# Patient Record
Sex: FEMALE | Race: BLACK OR AFRICAN AMERICAN | NOT HISPANIC OR LATINO | Employment: OTHER | URBAN - NONMETROPOLITAN AREA
[De-identification: names, ages, dates, MRNs, and addresses within clinical notes are randomized per-mention and may not be internally consistent; named-entity substitution may affect disease eponyms.]

---

## 2017-05-11 PROBLEM — N20.0 RENAL STONES: Status: ACTIVE | Noted: 2017-05-11

## 2017-05-11 PROBLEM — N30.11 INTERSTITIAL CYSTITIS (CHRONIC) WITH HEMATURIA: Status: ACTIVE | Noted: 2017-05-11

## 2020-06-08 ENCOUNTER — HISTORICAL (OUTPATIENT)
Dept: ADMINISTRATIVE | Facility: HOSPITAL | Age: 60
End: 2020-06-08

## 2020-06-11 LAB — SARS-COV-2 RNA RESP QL NAA+PROBE: NOT DETECTED

## 2021-01-22 DIAGNOSIS — R07.9 CHEST PAIN, UNSPECIFIED: Primary | ICD-10-CM

## 2021-01-25 ENCOUNTER — CLINICAL SUPPORT (OUTPATIENT)
Dept: CARDIOLOGY | Facility: HOSPITAL | Age: 61
End: 2021-01-25
Attending: INTERNAL MEDICINE
Payer: COMMERCIAL

## 2021-01-25 ENCOUNTER — HOSPITAL ENCOUNTER (OUTPATIENT)
Dept: RADIOLOGY | Facility: HOSPITAL | Age: 61
Discharge: HOME | End: 2021-01-25
Attending: INTERNAL MEDICINE
Payer: COMMERCIAL

## 2021-01-25 DIAGNOSIS — R07.9 CHEST PAIN, UNSPECIFIED: ICD-10-CM

## 2021-01-25 PROCEDURE — 78452 HT MUSCLE IMAGE SPECT MULT: CPT

## 2021-01-25 PROCEDURE — 93017 CV STRESS TEST TRACING ONLY: CPT

## 2021-01-26 LAB
CV STRESS BASE HR: 63 BPM
OHS CV CPX 85 PERCENT MAX PREDICTED HEART RATE MALE: 130
OHS CV CPX MAX PREDICTED HEART RATE: 153
OHS CV CPX PATIENT IS FEMALE: 1
OHS CV CPX PATIENT IS MALE: 0
OHS CV CPX PEAK HEAR RATE: 136 BPM
OHS CV CPX PERCENT MAX PREDICTED HEART RATE ACHIEVED: 89
STRESS ECHO POST EXERCISE DUR MIN: 6 MINUTES
STRESS ECHO POST EXERCISE DUR SEC: 29 SECONDS

## 2023-02-02 ENCOUNTER — LAB VISIT (OUTPATIENT)
Dept: LAB | Facility: HOSPITAL | Age: 63
End: 2023-02-02
Attending: INTERNAL MEDICINE
Payer: COMMERCIAL

## 2023-02-02 DIAGNOSIS — R94.31 NONSPECIFIC ABNORMAL ELECTROCARDIOGRAM (ECG) (EKG): Primary | ICD-10-CM

## 2023-02-02 LAB
ALBUMIN SERPL BCP-MCNC: 4.3 G/DL (ref 3.5–5.2)
ALP SERPL-CCNC: 60 U/L (ref 55–135)
ALT SERPL W/O P-5'-P-CCNC: 29 U/L (ref 10–44)
ANION GAP SERPL CALC-SCNC: 7 MMOL/L (ref 8–16)
AST SERPL-CCNC: 14 U/L (ref 10–40)
BASOPHILS # BLD AUTO: 0.06 K/UL (ref 0–0.2)
BASOPHILS NFR BLD: 1.2 % (ref 0–1.9)
BILIRUB SERPL-MCNC: 0.3 MG/DL (ref 0.1–1)
BUN SERPL-MCNC: 16 MG/DL (ref 8–23)
CALCIUM SERPL-MCNC: 10.2 MG/DL (ref 8.7–10.5)
CHLORIDE SERPL-SCNC: 104 MMOL/L (ref 95–110)
CO2 SERPL-SCNC: 29 MMOL/L (ref 23–29)
CREAT SERPL-MCNC: 0.7 MG/DL (ref 0.5–1.4)
DIFFERENTIAL METHOD: ABNORMAL
EOSINOPHIL # BLD AUTO: 0.1 K/UL (ref 0–0.5)
EOSINOPHIL NFR BLD: 1 % (ref 0–8)
ERYTHROCYTE [DISTWIDTH] IN BLOOD BY AUTOMATED COUNT: 12.9 % (ref 11.5–14.5)
EST. GFR  (NO RACE VARIABLE): >60 ML/MIN/1.73 M^2
GLUCOSE SERPL-MCNC: 107 MG/DL (ref 70–110)
HAV IGM SERPL QL IA: NORMAL
HBV CORE IGM SERPL QL IA: NORMAL
HBV SURFACE AG SERPL QL IA: NORMAL
HCT VFR BLD AUTO: 42.9 % (ref 37–48.5)
HCV AB SERPL QL IA: NORMAL
HGB BLD-MCNC: 13.5 G/DL (ref 12–16)
IMM GRANULOCYTES # BLD AUTO: 0.02 K/UL (ref 0–0.04)
IMM GRANULOCYTES NFR BLD AUTO: 0.4 % (ref 0–0.5)
LYMPHOCYTES # BLD AUTO: 1.9 K/UL (ref 1–4.8)
LYMPHOCYTES NFR BLD: 36.8 % (ref 18–48)
MCH RBC QN AUTO: 27.2 PG (ref 27–31)
MCHC RBC AUTO-ENTMCNC: 31.5 G/DL (ref 32–36)
MCV RBC AUTO: 86 FL (ref 82–98)
MONOCYTES # BLD AUTO: 0.5 K/UL (ref 0.3–1)
MONOCYTES NFR BLD: 8.8 % (ref 4–15)
NEUTROPHILS # BLD AUTO: 2.7 K/UL (ref 1.8–7.7)
NEUTROPHILS NFR BLD: 51.8 % (ref 38–73)
NRBC BLD-RTO: 0 /100 WBC
PLATELET # BLD AUTO: 364 K/UL (ref 150–450)
PMV BLD AUTO: 8.9 FL (ref 9.2–12.9)
POTASSIUM SERPL-SCNC: 4 MMOL/L (ref 3.5–5.1)
PROT SERPL-MCNC: 8.3 G/DL (ref 6–8.4)
RBC # BLD AUTO: 4.97 M/UL (ref 4–5.4)
SODIUM SERPL-SCNC: 140 MMOL/L (ref 136–145)
T4 FREE SERPL-MCNC: 0.85 NG/DL (ref 0.71–1.51)
TSH SERPL DL<=0.005 MIU/L-ACNC: 0.3 UIU/ML (ref 0.4–4)
WBC # BLD AUTO: 5.11 K/UL (ref 3.9–12.7)

## 2023-02-02 PROCEDURE — 80074 ACUTE HEPATITIS PANEL: CPT | Performed by: INTERNAL MEDICINE

## 2023-02-02 PROCEDURE — 80053 COMPREHEN METABOLIC PANEL: CPT | Performed by: INTERNAL MEDICINE

## 2023-02-02 PROCEDURE — 85025 COMPLETE CBC W/AUTO DIFF WBC: CPT | Performed by: INTERNAL MEDICINE

## 2023-02-02 PROCEDURE — 84443 ASSAY THYROID STIM HORMONE: CPT | Performed by: INTERNAL MEDICINE

## 2023-02-02 PROCEDURE — 36415 COLL VENOUS BLD VENIPUNCTURE: CPT | Performed by: INTERNAL MEDICINE

## 2023-02-02 PROCEDURE — 84439 ASSAY OF FREE THYROXINE: CPT | Performed by: INTERNAL MEDICINE

## 2023-02-06 DIAGNOSIS — R07.9 CHEST PAIN, UNSPECIFIED: Primary | ICD-10-CM

## 2023-02-06 DIAGNOSIS — R94.31 NONSPECIFIC ABNORMAL ELECTROCARDIOGRAM (ECG) (EKG): ICD-10-CM

## 2023-02-10 ENCOUNTER — HOSPITAL ENCOUNTER (OUTPATIENT)
Dept: RADIOLOGY | Facility: HOSPITAL | Age: 63
Discharge: HOME | End: 2023-02-10
Attending: INTERNAL MEDICINE
Payer: COMMERCIAL

## 2023-02-10 ENCOUNTER — CLINICAL SUPPORT (OUTPATIENT)
Dept: CARDIOLOGY | Facility: HOSPITAL | Age: 63
End: 2023-02-10
Attending: INTERNAL MEDICINE
Payer: COMMERCIAL

## 2023-02-10 DIAGNOSIS — R07.9 CHEST PAIN, UNSPECIFIED: ICD-10-CM

## 2023-02-10 DIAGNOSIS — R94.31 NONSPECIFIC ABNORMAL ELECTROCARDIOGRAM (ECG) (EKG): ICD-10-CM

## 2023-02-10 PROCEDURE — A9500 TC99M SESTAMIBI: HCPCS

## 2023-02-10 PROCEDURE — 93017 CV STRESS TEST TRACING ONLY: CPT

## 2023-02-10 PROCEDURE — 78452 HT MUSCLE IMAGE SPECT MULT: CPT

## 2023-02-15 LAB
CV STRESS BASE HR: 73 BPM
DIASTOLIC BLOOD PRESSURE: 109 MMHG
NUC REST EJECTION FRACTION: 70
OHS CV CPX 85 PERCENT MAX PREDICTED HEART RATE MALE: 128
OHS CV CPX ESTIMATED METS: 6
OHS CV CPX MAX PREDICTED HEART RATE: 151
OHS CV CPX PATIENT IS FEMALE: 1
OHS CV CPX PATIENT IS MALE: 0
OHS CV CPX PEAK DIASTOLIC BLOOD PRESSURE: 109 MMHG
OHS CV CPX PEAK HEAR RATE: 135 BPM
OHS CV CPX PEAK RATE PRESSURE PRODUCT: NORMAL
OHS CV CPX PEAK SYSTOLIC BLOOD PRESSURE: 163 MMHG
OHS CV CPX PERCENT MAX PREDICTED HEART RATE ACHIEVED: 90
OHS CV CPX RATE PRESSURE PRODUCT PRESENTING: NORMAL
STRESS ECHO POST EXERCISE DUR MIN: 4 MINUTES
SYSTOLIC BLOOD PRESSURE: 143 MMHG

## 2023-11-02 ENCOUNTER — LAB VISIT (OUTPATIENT)
Dept: LAB | Facility: HOSPITAL | Age: 63
End: 2023-11-02
Attending: INTERNAL MEDICINE
Payer: COMMERCIAL

## 2023-11-02 DIAGNOSIS — E05.90 PRETIBIAL MYXEDEMA: ICD-10-CM

## 2023-11-02 DIAGNOSIS — R73.9 BLOOD GLUCOSE ELEVATED: ICD-10-CM

## 2023-11-02 DIAGNOSIS — E78.00 PURE HYPERCHOLESTEROLEMIA: Primary | ICD-10-CM

## 2023-11-02 DIAGNOSIS — E78.00 PURE HYPERCHOLESTEROLEMIA: ICD-10-CM

## 2023-11-02 LAB
ALBUMIN SERPL BCP-MCNC: 4.1 G/DL (ref 3.5–5.2)
ALP SERPL-CCNC: 64 U/L (ref 55–135)
ALT SERPL W/O P-5'-P-CCNC: 22 U/L (ref 10–44)
ANION GAP SERPL CALC-SCNC: 3 MMOL/L (ref 3–11)
AST SERPL-CCNC: 17 U/L (ref 10–40)
BILIRUB SERPL-MCNC: 0.4 MG/DL (ref 0.1–1)
BUN SERPL-MCNC: 11 MG/DL (ref 8–23)
CALCIUM SERPL-MCNC: 8.9 MG/DL (ref 8.7–10.5)
CHLORIDE SERPL-SCNC: 109 MMOL/L (ref 95–110)
CHOLEST SERPL-MCNC: 241 MG/DL (ref 120–199)
CHOLEST/HDLC SERPL: 4.2 {RATIO} (ref 2–5)
CO2 SERPL-SCNC: 29 MMOL/L (ref 23–29)
CREAT SERPL-MCNC: 0.8 MG/DL (ref 0.5–1.4)
EST. GFR  (NO RACE VARIABLE): >60 ML/MIN/1.73 M^2
ESTIMATED AVG GLUCOSE: 111 MG/DL (ref 68–131)
GLUCOSE SERPL-MCNC: 102 MG/DL (ref 70–110)
HBA1C MFR BLD: 5.5 % (ref 4–5.6)
HDLC SERPL-MCNC: 57 MG/DL (ref 40–75)
HDLC SERPL: 23.7 % (ref 20–50)
LDLC SERPL CALC-MCNC: 165.2 MG/DL (ref 63–159)
NONHDLC SERPL-MCNC: 184 MG/DL
POTASSIUM SERPL-SCNC: 4 MMOL/L (ref 3.5–5.1)
PROT SERPL-MCNC: 7.7 G/DL (ref 6–8.4)
SODIUM SERPL-SCNC: 141 MMOL/L (ref 136–145)
T4 FREE SERPL-MCNC: 0.87 NG/DL (ref 0.71–1.51)
TRIGL SERPL-MCNC: 94 MG/DL (ref 30–150)
TSH SERPL DL<=0.005 MIU/L-ACNC: 0.26 UIU/ML (ref 0.4–4)

## 2023-11-02 PROCEDURE — 84443 ASSAY THYROID STIM HORMONE: CPT | Performed by: INTERNAL MEDICINE

## 2023-11-02 PROCEDURE — 84439 ASSAY OF FREE THYROXINE: CPT | Performed by: INTERNAL MEDICINE

## 2023-11-02 PROCEDURE — 83036 HEMOGLOBIN GLYCOSYLATED A1C: CPT | Performed by: INTERNAL MEDICINE

## 2023-11-02 PROCEDURE — 80053 COMPREHEN METABOLIC PANEL: CPT | Performed by: INTERNAL MEDICINE

## 2023-11-02 PROCEDURE — 80061 LIPID PANEL: CPT | Performed by: INTERNAL MEDICINE

## 2023-11-02 PROCEDURE — 36415 COLL VENOUS BLD VENIPUNCTURE: CPT | Performed by: INTERNAL MEDICINE

## 2024-03-21 ENCOUNTER — TELEPHONE (OUTPATIENT)
Dept: CARDIOLOGY | Facility: CLINIC | Age: 64
End: 2024-03-21
Payer: COMMERCIAL

## 2024-03-21 ENCOUNTER — LAB VISIT (OUTPATIENT)
Dept: LAB | Facility: HOSPITAL | Age: 64
End: 2024-03-21
Attending: INTERNAL MEDICINE
Payer: COMMERCIAL

## 2024-03-21 DIAGNOSIS — R06.02 SOB (SHORTNESS OF BREATH): ICD-10-CM

## 2024-03-21 DIAGNOSIS — R07.9 CHEST PAIN, UNSPECIFIED TYPE: ICD-10-CM

## 2024-03-21 DIAGNOSIS — R07.9 CHEST PAIN, UNSPECIFIED TYPE: Primary | ICD-10-CM

## 2024-03-21 LAB
ANION GAP SERPL CALC-SCNC: 6 MMOL/L (ref 3–11)
APTT PPP: 24.3 SEC (ref 21–32)
BASOPHILS # BLD AUTO: 0.09 K/UL (ref 0–0.2)
BASOPHILS NFR BLD: 2 % (ref 0–1.9)
BUN SERPL-MCNC: 10 MG/DL (ref 8–23)
CALCIUM SERPL-MCNC: 9.7 MG/DL (ref 8.7–10.5)
CHLORIDE SERPL-SCNC: 108 MMOL/L (ref 95–110)
CO2 SERPL-SCNC: 28 MMOL/L (ref 23–29)
CREAT SERPL-MCNC: 0.7 MG/DL (ref 0.5–1.4)
DIFFERENTIAL METHOD BLD: ABNORMAL
EOSINOPHIL # BLD AUTO: 0.1 K/UL (ref 0–0.5)
EOSINOPHIL NFR BLD: 2 % (ref 0–8)
ERYTHROCYTE [DISTWIDTH] IN BLOOD BY AUTOMATED COUNT: 13 % (ref 11.5–14.5)
EST. GFR  (NO RACE VARIABLE): >60 ML/MIN/1.73 M^2
GLUCOSE SERPL-MCNC: 99 MG/DL (ref 70–110)
HCT VFR BLD AUTO: 39 % (ref 37–48.5)
HGB BLD-MCNC: 12.3 G/DL (ref 12–16)
IMM GRANULOCYTES # BLD AUTO: 0.01 K/UL (ref 0–0.04)
IMM GRANULOCYTES NFR BLD AUTO: 0.2 % (ref 0–0.5)
INR PPP: 1 (ref 0.8–1.2)
LYMPHOCYTES # BLD AUTO: 2 K/UL (ref 1–4.8)
LYMPHOCYTES NFR BLD: 43.6 % (ref 18–48)
MCH RBC QN AUTO: 27.8 PG (ref 27–31)
MCHC RBC AUTO-ENTMCNC: 31.5 G/DL (ref 32–36)
MCV RBC AUTO: 88 FL (ref 82–98)
MONOCYTES # BLD AUTO: 0.4 K/UL (ref 0.3–1)
MONOCYTES NFR BLD: 8.7 % (ref 4–15)
NEUTROPHILS # BLD AUTO: 2 K/UL (ref 1.8–7.7)
NEUTROPHILS NFR BLD: 43.5 % (ref 38–73)
NRBC BLD-RTO: 0 /100 WBC
PLATELET # BLD AUTO: 288 K/UL (ref 150–450)
PMV BLD AUTO: 9.6 FL (ref 9.2–12.9)
POTASSIUM SERPL-SCNC: 3.7 MMOL/L (ref 3.5–5.1)
PROTHROMBIN TIME: 10.8 SEC (ref 9–12.5)
RBC # BLD AUTO: 4.43 M/UL (ref 4–5.4)
SODIUM SERPL-SCNC: 142 MMOL/L (ref 136–145)
WBC # BLD AUTO: 4.61 K/UL (ref 3.9–12.7)

## 2024-03-21 PROCEDURE — 80048 BASIC METABOLIC PNL TOTAL CA: CPT | Performed by: INTERNAL MEDICINE

## 2024-03-21 PROCEDURE — 36415 COLL VENOUS BLD VENIPUNCTURE: CPT | Performed by: INTERNAL MEDICINE

## 2024-03-21 PROCEDURE — 85730 THROMBOPLASTIN TIME PARTIAL: CPT | Performed by: INTERNAL MEDICINE

## 2024-03-21 PROCEDURE — 85610 PROTHROMBIN TIME: CPT | Performed by: INTERNAL MEDICINE

## 2024-03-21 PROCEDURE — 85025 COMPLETE CBC W/AUTO DIFF WBC: CPT | Performed by: INTERNAL MEDICINE

## 2024-03-21 NOTE — TELEPHONE ENCOUNTER
Received records from Dr. Chavez' Staff requesting patient be scheduled for cath with Dr. Arana and all scanned to Media    Telephoned patient to Needs to discuss/confirm scheduling Heart cath and confirmed Tuesday 3/26 for 1130am with 10am arrival time.  Reviewed NPO status, medications and exception, scheduled Lab work at [facility] in Section, answered all questions and ready to proceed

## 2024-03-25 ENCOUNTER — TELEPHONE (OUTPATIENT)
Dept: CARDIOLOGY | Facility: CLINIC | Age: 64
End: 2024-03-25
Payer: COMMERCIAL

## 2024-03-25 ENCOUNTER — PATIENT MESSAGE (OUTPATIENT)
Dept: CARDIOLOGY | Facility: CLINIC | Age: 64
End: 2024-03-25
Payer: COMMERCIAL

## 2024-03-25 NOTE — TELEPHONE ENCOUNTER
----- Message from Franchesca Tapia sent at 3/25/2024  3:54 PM CDT -----  Contact: Patient, [phone]  Calling because [facility] is requesting a Inpz-ta-Pqmi, phone [phone]., for the Angiogram. Please advise. Thanks.

## 2024-03-25 NOTE — TELEPHONE ENCOUNTER
Returned call and spoke with patient and spouse in the back ground. I explained to patient conversation had with Mrs. Roa ans patient states that's  what Mrs. Roa said.    Patient was instrcted to await return call from Mrs. Roa    Patient verbalized understanding.

## 2024-03-25 NOTE — TELEPHONE ENCOUNTER
Dr. Chavez has been notified    ----- Message from Franchesca Tapia sent at 3/25/2024  3:54 PM CDT -----  Contact: Patient, [phone]  Calling because [facility] is requesting a Xfxr-jk-Pijw, phone [phone]., for the Angiogram. Please advise. Thanks.

## 2024-03-25 NOTE — TELEPHONE ENCOUNTER
Attempted to get patient on her phone line without success  Contacted her alternate line and spoke with  Price and Mrs. Price advised of need to postpone for now- pending follow up with Insurance' recommendations

## 2024-03-25 NOTE — TELEPHONE ENCOUNTER
Returned call to patient  and answered all questions, repeated arrival [phone]  Also notified Insurance approval is still pending  Checking with Dr. Chavez for possible P2P [phone]/ ID [id number]      ----- Message from Tamica Babcock sent at 3/25/2024  3:28 PM CDT -----  .Type:  Needs Medical Advice    Who Called: pt  Would the patient rather a call back or a response via [facility]? Call back  Best Call Back Number: [phone]  Additional Information:     Pt would like a call back for conformation for her procedure tomorrow

## 2024-03-26 ENCOUNTER — HOSPITAL ENCOUNTER (OUTPATIENT)
Facility: HOSPITAL | Age: 64
Discharge: HOME | End: 2024-03-26
Attending: INTERNAL MEDICINE | Admitting: INTERNAL MEDICINE
Payer: COMMERCIAL

## 2024-03-26 ENCOUNTER — PATIENT MESSAGE (OUTPATIENT)
Dept: CARDIOLOGY | Facility: CLINIC | Age: 64
End: 2024-03-26
Payer: COMMERCIAL

## 2024-03-26 VITALS
RESPIRATION RATE: 21 BRPM | WEIGHT: 147.06 LBS | OXYGEN SATURATION: 99 % | HEART RATE: 61 BPM | HEIGHT: 63 IN | SYSTOLIC BLOOD PRESSURE: 165 MMHG | DIASTOLIC BLOOD PRESSURE: 83 MMHG | TEMPERATURE: 98 F | BODY MASS INDEX: 26.06 KG/M2

## 2024-03-26 DIAGNOSIS — E78.01 FAMILIAL HYPERCHOLESTEROLEMIA: ICD-10-CM

## 2024-03-26 DIAGNOSIS — Z01.810 PREPROCEDURAL CARDIOVASCULAR EXAMINATION: ICD-10-CM

## 2024-03-26 DIAGNOSIS — R94.31 ABNORMAL EKG: ICD-10-CM

## 2024-03-26 DIAGNOSIS — R94.39 ABNORMAL STRESS ECG WITH TREADMILL: ICD-10-CM

## 2024-03-26 DIAGNOSIS — R07.89 CHEST TIGHTNESS: ICD-10-CM

## 2024-03-26 DIAGNOSIS — R06.02 SOB (SHORTNESS OF BREATH): ICD-10-CM

## 2024-03-26 DIAGNOSIS — R06.09 DOE (DYSPNEA ON EXERTION): ICD-10-CM

## 2024-03-26 PROBLEM — I20.0 ACCELERATING ANGINA: Status: ACTIVE | Noted: 2024-03-26

## 2024-03-26 PROBLEM — E78.5 HYPERLIPIDEMIA: Status: ACTIVE | Noted: 2024-03-26

## 2024-03-26 LAB — CATH EF QUANTITATIVE: 60 %

## 2024-03-26 PROCEDURE — 25000003 PHARM REV CODE 250: Performed by: INTERNAL MEDICINE

## 2024-03-26 PROCEDURE — C1769 GUIDE WIRE: HCPCS | Performed by: INTERNAL MEDICINE

## 2024-03-26 PROCEDURE — 99152 MOD SED SAME PHYS/QHP 5/>YRS: CPT | Mod: ,,, | Performed by: INTERNAL MEDICINE

## 2024-03-26 PROCEDURE — C1887 CATHETER, GUIDING: HCPCS | Performed by: INTERNAL MEDICINE

## 2024-03-26 PROCEDURE — 93010 ELECTROCARDIOGRAM REPORT: CPT | Mod: ,,, | Performed by: STUDENT IN AN ORGANIZED HEALTH CARE EDUCATION/TRAINING PROGRAM

## 2024-03-26 PROCEDURE — 93458 L HRT ARTERY/VENTRICLE ANGIO: CPT | Mod: 26,,, | Performed by: INTERNAL MEDICINE

## 2024-03-26 PROCEDURE — 93458 L HRT ARTERY/VENTRICLE ANGIO: CPT | Performed by: INTERNAL MEDICINE

## 2024-03-26 PROCEDURE — C1894 INTRO/SHEATH, NON-LASER: HCPCS | Performed by: INTERNAL MEDICINE

## 2024-03-26 PROCEDURE — 99152 MOD SED SAME PHYS/QHP 5/>YRS: CPT | Performed by: INTERNAL MEDICINE

## 2024-03-26 PROCEDURE — 63600175 PHARM REV CODE 636 W HCPCS: Performed by: INTERNAL MEDICINE

## 2024-03-26 PROCEDURE — 93005 ELECTROCARDIOGRAM TRACING: CPT | Mod: 59

## 2024-03-26 RX ORDER — LIDOCAINE HYDROCHLORIDE 20 MG/ML
INJECTION, SOLUTION EPIDURAL; INFILTRATION; INTRACAUDAL; PERINEURAL
Status: DISCONTINUED | OUTPATIENT
Start: 2024-03-26 | End: 2024-03-26 | Stop reason: HOSPADM

## 2024-03-26 RX ORDER — ACETAMINOPHEN 325 MG/1
650 TABLET ORAL EVERY 4 HOURS PRN
Status: DISCONTINUED | OUTPATIENT
Start: 2024-03-26 | End: 2024-03-26 | Stop reason: HOSPADM

## 2024-03-26 RX ORDER — VERAPAMIL HYDROCHLORIDE 2.5 MG/ML
INJECTION, SOLUTION INTRAVENOUS
Status: DISCONTINUED | OUTPATIENT
Start: 2024-03-26 | End: 2024-03-26 | Stop reason: HOSPADM

## 2024-03-26 RX ORDER — ASPIRIN 81 MG/1
81 TABLET ORAL ONCE
Status: DISCONTINUED | OUTPATIENT
Start: 2024-03-26 | End: 2024-03-26

## 2024-03-26 RX ORDER — NITROGLYCERIN 5 MG/ML
INJECTION, SOLUTION INTRAVENOUS
Status: DISCONTINUED | OUTPATIENT
Start: 2024-03-26 | End: 2024-03-26 | Stop reason: HOSPADM

## 2024-03-26 RX ORDER — MIDAZOLAM HYDROCHLORIDE 1 MG/ML
INJECTION INTRAMUSCULAR; INTRAVENOUS
Status: DISCONTINUED | OUTPATIENT
Start: 2024-03-26 | End: 2024-03-26 | Stop reason: HOSPADM

## 2024-03-26 RX ORDER — NAPROXEN SODIUM 220 MG/1
81 TABLET, FILM COATED ORAL DAILY
Status: DISCONTINUED | OUTPATIENT
Start: 2024-03-26 | End: 2024-03-26 | Stop reason: HOSPADM

## 2024-03-26 RX ORDER — HEPARIN SODIUM 1000 [USP'U]/ML
INJECTION, SOLUTION INTRAVENOUS; SUBCUTANEOUS
Status: DISCONTINUED | OUTPATIENT
Start: 2024-03-26 | End: 2024-03-26 | Stop reason: HOSPADM

## 2024-03-26 RX ORDER — SODIUM CHLORIDE 9 MG/ML
INJECTION, SOLUTION INTRAVENOUS CONTINUOUS
Status: DISCONTINUED | OUTPATIENT
Start: 2024-03-26 | End: 2024-03-26 | Stop reason: HOSPADM

## 2024-03-26 RX ORDER — ONDANSETRON 8 MG/1
8 TABLET, ORALLY DISINTEGRATING ORAL EVERY 8 HOURS PRN
Status: DISCONTINUED | OUTPATIENT
Start: 2024-03-26 | End: 2024-03-26 | Stop reason: HOSPADM

## 2024-03-26 RX ORDER — FENTANYL CITRATE 50 UG/ML
25 INJECTION, SOLUTION INTRAMUSCULAR; INTRAVENOUS ONCE
Status: COMPLETED | OUTPATIENT
Start: 2024-03-26 | End: 2024-03-26

## 2024-03-26 RX ORDER — DIAZEPAM 5 MG/1
5 TABLET ORAL ONCE
Status: COMPLETED | OUTPATIENT
Start: 2024-03-26 | End: 2024-03-26

## 2024-03-26 RX ORDER — FENTANYL CITRATE 50 UG/ML
INJECTION, SOLUTION INTRAMUSCULAR; INTRAVENOUS
Status: DISCONTINUED | OUTPATIENT
Start: 2024-03-26 | End: 2024-03-26 | Stop reason: HOSPADM

## 2024-03-26 RX ORDER — DIPHENHYDRAMINE HCL 50 MG
50 CAPSULE ORAL ONCE
Status: COMPLETED | OUTPATIENT
Start: 2024-03-26 | End: 2024-03-26

## 2024-03-26 RX ORDER — LORAZEPAM 2 MG/ML
0.5 INJECTION INTRAMUSCULAR ONCE
Status: COMPLETED | OUTPATIENT
Start: 2024-03-26 | End: 2024-03-26

## 2024-03-26 RX ORDER — HYDRALAZINE HYDROCHLORIDE 20 MG/ML
10 INJECTION INTRAMUSCULAR; INTRAVENOUS ONCE
Status: DISCONTINUED | OUTPATIENT
Start: 2024-03-26 | End: 2024-03-26 | Stop reason: HOSPADM

## 2024-03-26 RX ADMIN — DIAZEPAM 5 MG: 5 TABLET ORAL at 01:03

## 2024-03-26 RX ADMIN — FENTANYL CITRATE 25 MCG: 50 INJECTION INTRAMUSCULAR; INTRAVENOUS at 04:03

## 2024-03-26 RX ADMIN — DIPHENHYDRAMINE HYDROCHLORIDE 50 MG: 50 CAPSULE ORAL at 01:03

## 2024-03-26 RX ADMIN — SODIUM CHLORIDE: 9 INJECTION, SOLUTION INTRAVENOUS at 01:03

## 2024-03-26 RX ADMIN — LORAZEPAM 0.5 MG: 2 INJECTION INTRAMUSCULAR; INTRAVENOUS at 04:03

## 2024-03-26 RX ADMIN — ASPIRIN 81 MG CHEWABLE TABLET 81 MG: 81 TABLET CHEWABLE at 01:03

## 2024-03-26 NOTE — ASSESSMENT & PLAN NOTE
-Patient who presents with accelerating exertional CP symptoms with associated SOB, BUE pain, back pain, and face tingling  -Recent treadmill stress test 3/15/24 reproduced CP symptoms with 1.5 mm ST depression noted in leads V4-V6  -[facility] planned today by Dr. Arana for definitive assessment and PCI if indicated. He explained all risks, benefits, and treatment alternatives. All questions answered. She has agreed to proceed.

## 2024-03-26 NOTE — SUBJECTIVE & OBJECTIVE
Past Medical History:   Diagnosis Date    Anxiety     Arthritis     BACK    Insomnia     Kidney stone     Plantar fasciitis of left foot     Thyroid disease     HYPER    Wears glasses        Past Surgical History:   Procedure Laterality Date    COLONOSCOPY      CYST REMOVAL Left     BREAST    CYSTOSCOPY      HYSTERECTOMY         Review of patient's allergies indicates:   Allergen Reactions    Latex, natural rubber     Sulfa (sulfonamide antibiotics)        No current facility-administered medications on file prior to encounter.     Current Outpatient Medications on File Prior to Encounter   Medication Sig    ciprofloxacin HCl (CIPRO) 500 MG tablet Take 500 mg by mouth every 12 (twelve) hours.    diphenhydrAMINE (BENADRYL) 25 mg capsule Take 25 mg by mouth every 6 (six) hours as needed for Itching.    FEXOFENADINE HCL (MUCINEX ALLERGY ORAL) Take 1 tablet by mouth as needed.    hydrocodone-acetaminophen 5-325mg (NORCO) 5-325 mg per tablet Take 1 tablet by mouth every 6 (six) hours as needed for Pain.    phenazopyridine (PYRIDIUM) 200 MG tablet Take 200 mg by mouth 3 (three) times daily as needed for Pain.    tizanidine 2 mg Cap Take 1 each by mouth continuous prn.    ZOLPIDEM TARTRATE (ZOLPIDEM ORAL) Take 1 tablet by mouth as needed.     Family History       Problem Relation (Age of Onset)    Diabetes Father          Tobacco Use    Smoking status: Never    Smokeless tobacco: Not on file   Substance and Sexual Activity    Alcohol use: Yes     Comment: SOCIAL    Drug use: No    Sexual activity: Not on file     Review of Systems   Constitutional: Negative.   HENT: Negative.     Eyes: Negative.    Cardiovascular:  Positive for chest pain and dyspnea on exertion.   Respiratory:  Positive for shortness of breath.    Endocrine: Negative.    Hematologic/Lymphatic: Negative.    Musculoskeletal:  Positive for back pain.        BUE pain   Gastrointestinal: Negative.    Genitourinary: Negative.    Neurological: Negative.   "  Psychiatric/Behavioral: Negative.     Allergic/Immunologic: Negative.      Objective:     Vital Signs (Most Recent):  Temp: 97.7 °F (36.5 °C) (03/26/24 1322)  Pulse: 67 (03/26/24 1324)  Resp: 18 (03/26/24 1322)  BP: (!) 159/82 (03/26/24 1324)  SpO2: 98 % (03/26/24 1324) Vital Signs (24h Range):  Temp:  [97.7 °F (36.5 °C)] 97.7 °F (36.5 °C)  Pulse:  [67] 67  Resp:  [18] 18  SpO2:  [98 %] 98 %  BP: (159)/(82) 159/82     Weight: 66.7 kg (147 lb 0.8 oz)  Body mass index is 26.05 kg/m².    SpO2: 98 %       No intake or output data in the 24 hours ending 03/26/24 1342    Lines/Drains/Airways       Peripheral Intravenous Line  Duration                  Peripheral IV - Single Lumen 03/26/24 1329 20 G Anterior;Proximal;Right Forearm <1 day                     Physical Exam  Vitals and nursing note reviewed.   Constitutional:       General: She is not in acute distress.     Appearance: Normal appearance. She is well-developed. She is not diaphoretic.   HENT:      Head: Normocephalic and atraumatic.   Eyes:      General:         Right eye: No discharge.         Left eye: No discharge.      Pupils: Pupils are equal, round, and reactive to light.   Cardiovascular:      Rate and Rhythm: Normal rate and regular rhythm.      Heart sounds: Normal heart sounds, S1 normal and S2 normal. No murmur heard.  Pulmonary:      Effort: Pulmonary effort is normal.   Abdominal:      General: There is no distension.   Musculoskeletal:      Right lower leg: No edema.      Left lower leg: No edema.   Skin:     General: Skin is warm and dry.      Findings: No erythema.   Neurological:      Mental Status: She is alert and oriented to person, place, and time.   Psychiatric:         Mood and Affect: Mood normal.         Behavior: Behavior normal.      Comments: anxious          Significant Labs: CMP No results for input(s): "NA", "K", "CL", "CO2", "GLU", "BUN", "CREATININE", "CALCIUM", "PROT", "ALBUMIN", "BILITOT", "ALKPHOS", "AST", "ALT", " ""ANIONGAP", "ESTGFRAFRICA", "EGFRNONAA" in the last 48 hours., CBC No results for input(s): "WBC", "HGB", "HCT", "PLT" in the last 48 hours., Troponin No results for input(s): "TROPONINI" in the last 48 hours., and All pertinent lab results from the last 24 hours have been reviewed.    Significant Imaging: Echocardiogram: Transthoracic echo (TTE) complete (Cupid Only): No results found for this or any previous visit.    Stress test, TTE, and EKG reviewed    Recent labs reviewed/stable  "

## 2024-03-26 NOTE — TELEPHONE ENCOUNTER
"Re-contacted patient's Insurance and after speaking with Nurse Adviser she was approved to proceed with Diagnostic Heart cath  Advised patient to report to Registration Dept  She stated " Im having facial tingling, headache, chest discomfort.  I have not had anything to eat.  I took a butabital for the headache with water this morning."   Advised not to eat anything or take any other medications.  Rashard Arana and Scott notified  "

## 2024-03-26 NOTE — Clinical Note
The closure device was deployed in the left radial artery. 11 cc's of air were inserted into the closure device.

## 2024-03-26 NOTE — OP NOTE
INPATIENT Operative Note         SUMMARY     Surgery Date: 3/26/2024     Surgeon(s) and Role:     * Gricelda Arana MD - Primary    ASSISTANT:none    Pre-op Diagnosis:  SOB (shortness of breath) [R06.02]  ASHLEY (dyspnea on exertion) [R06.09]  Chest tightness [R07.89]  Abnormal EKG [R94.31]  Familial hypercholesterolemia [E78.01]  Abnormal stress ECG with treadmill [R94.39]      Post-op Diagnosis:  SOB (shortness of breath) [R06.02]  ASHLEY (dyspnea on exertion) [R06.09]  Chest tightness [R07.89]  Abnormal EKG [R94.31]  Familial hypercholesterolemia [E78.01]  Abnormal stress ECG with treadmill [R94.39]    Procedure(s) (LRB):  Left heart cath (Left)    COMPLICATION:none    Anesthesia: RN IV Sedation    Findings/Key Components:  hnormaL TORTUOUS CORONARIES    NORMAL LVF    LVEDP 21    Estimated Blood Loss: < 50 ML.         SPECIMEN: NONE    Devices/Prostetics: None    PLAN: REASSURE.

## 2024-03-26 NOTE — H&P
[facility] - Cath Lab ([facility])  Cardiology  History and Physical     Patient Name: Tamar rPice  MRN: [id number]  Admission Date: 3/26/2024  Code Status: No Order   Attending Provider: Gricelda Arana MD   Primary Care Physician: Janice Guillen MD  Principal Problem:<principal problem not specified>    Patient information was obtained from patient, past medical records, and ER records.     Subjective:     Chief Complaint:  CP/accelerating angina/abnormal treadmill stress test     HPI:  Ms. Price is a 64 year old female patient whose current medical conditions include HTN, carotid artery disease, hyperlipidemia, NORA, and degenerative disc disease who presents to [facility] for elective LHC due to abnormal stress test. She complains of ongoing exertional substernal chest tightness/pressure associated with ASHLEY, back pain, face tingling, and bilateral arm discomfort. She underwent  recent treadmill stress teston 3/15/24 which reproduced her chest pain symptoms and showed ST depression in leads V4-V6. Non-smoker. Does have familial history of CAD. TTE 3/24 reviewed, normal EF, trace MR, trace TR.        Past Medical History:   Diagnosis Date    Anxiety     Arthritis     BACK    Insomnia     Kidney stone     Plantar fasciitis of left foot     Thyroid disease     HYPER    Wears glasses        Past Surgical History:   Procedure Laterality Date    COLONOSCOPY      CYST REMOVAL Left     BREAST    CYSTOSCOPY      HYSTERECTOMY         Review of patient's allergies indicates:   Allergen Reactions    Latex, natural rubber     Sulfa (sulfonamide antibiotics)        No current facility-administered medications on file prior to encounter.     Current Outpatient Medications on File Prior to Encounter   Medication Sig    ciprofloxacin HCl (CIPRO) 500 MG tablet Take 500 mg by mouth every 12 (twelve) hours.    diphenhydrAMINE (BENADRYL) 25 mg capsule Take 25 mg by mouth every 6 (six) hours as needed for Itching.    FEXOFENADINE HCL  (MUCINEX ALLERGY ORAL) Take 1 tablet by mouth as needed.    hydrocodone-acetaminophen 5-325mg (NORCO) 5-325 mg per tablet Take 1 tablet by mouth every 6 (six) hours as needed for Pain.    phenazopyridine (PYRIDIUM) 200 MG tablet Take 200 mg by mouth 3 (three) times daily as needed for Pain.    tizanidine 2 mg Cap Take 1 each by mouth continuous prn.    ZOLPIDEM TARTRATE (ZOLPIDEM ORAL) Take 1 tablet by mouth as needed.     Family History       Problem Relation (Age of Onset)    Diabetes Father          Tobacco Use    Smoking status: Never    Smokeless tobacco: Not on file   Substance and Sexual Activity    Alcohol use: Yes     Comment: SOCIAL    Drug use: No    Sexual activity: Not on file     Review of Systems   Constitutional: Negative.   HENT: Negative.     Eyes: Negative.    Cardiovascular:  Positive for chest pain and dyspnea on exertion.   Respiratory:  Positive for shortness of breath.    Endocrine: Negative.    Hematologic/Lymphatic: Negative.    Musculoskeletal:  Positive for back pain.        BUE pain   Gastrointestinal: Negative.    Genitourinary: Negative.    Neurological: Negative.    Psychiatric/Behavioral: Negative.     Allergic/Immunologic: Negative.      Objective:     Vital Signs (Most Recent):  Temp: 97.7 °F (36.5 °C) (03/26/24 1322)  Pulse: 67 (03/26/24 1324)  Resp: 18 (03/26/24 1322)  BP: (!) 159/82 (03/26/24 1324)  SpO2: 98 % (03/26/24 1324) Vital Signs (24h Range):  Temp:  [97.7 °F (36.5 °C)] 97.7 °F (36.5 °C)  Pulse:  [67] 67  Resp:  [18] 18  SpO2:  [98 %] 98 %  BP: (159)/(82) 159/82     Weight: 66.7 kg (147 lb 0.8 oz)  Body mass index is 26.05 kg/m².    SpO2: 98 %       No intake or output data in the 24 hours ending 03/26/24 1342    Lines/Drains/Airways       Peripheral Intravenous Line  Duration                  Peripheral IV - Single Lumen 03/26/24 1329 20 G Anterior;Proximal;Right Forearm <1 day                     Physical Exam  Vitals and nursing note reviewed.   Constitutional:        "General: She is not in acute distress.     Appearance: Normal appearance. She is well-developed. She is not diaphoretic.   HENT:      Head: Normocephalic and atraumatic.   Eyes:      General:         Right eye: No discharge.         Left eye: No discharge.      Pupils: Pupils are equal, round, and reactive to light.   Cardiovascular:      Rate and Rhythm: Normal rate and regular rhythm.      Heart sounds: Normal heart sounds, S1 normal and S2 normal. No murmur heard.  Pulmonary:      Effort: Pulmonary effort is normal.   Abdominal:      General: There is no distension.   Musculoskeletal:      Right lower leg: No edema.      Left lower leg: No edema.   Skin:     General: Skin is warm and dry.      Findings: No erythema.   Neurological:      Mental Status: She is alert and oriented to person, place, and time.   Psychiatric:         Mood and Affect: Mood normal.         Behavior: Behavior normal.      Comments: anxious          Significant Labs: CMP No results for input(s): "NA", "K", "CL", "CO2", "GLU", "BUN", "CREATININE", "CALCIUM", "PROT", "ALBUMIN", "BILITOT", "ALKPHOS", "AST", "ALT", "ANIONGAP", "ESTGFRAFRICA", "EGFRNONAA" in the last 48 hours., CBC No results for input(s): "WBC", "HGB", "HCT", "PLT" in the last 48 hours., Troponin No results for input(s): "TROPONINI" in the last 48 hours., and All pertinent lab results from the last 24 hours have been reviewed.    Significant Imaging: Echocardiogram: Transthoracic echo (TTE) complete (Cupid Only): No results found for this or any previous visit.    Stress test, TTE, and EKG reviewed    Recent labs reviewed/stable  Assessment and Plan:     Abnormal stress ECG with treadmill  -[facility] today    Hyperlipidemia  -Statin    Accelerating angina  -Patient who presents with accelerating exertional CP symptoms with associated SOB, BUE pain, back pain, and face tingling  -Recent treadmill stress test 3/15/24 reproduced CP symptoms with 1.5 mm ST depression noted in leads " V4-V6  -C planned today by Dr. Arana for definitive assessment and PCI if indicated. He explained all risks, benefits, and treatment alternatives. All questions answered. She has agreed to proceed.        VTE Risk Mitigation (From admission, onward)      None            Ernestina Alejandra PA-C  Cardiology   [facility] ([facility])

## 2024-03-26 NOTE — HPI
Ms. Price is a 64 year old female patient whose current medical conditions include HTN, carotid artery disease, hyperlipidemia, NORA, and degenerative disc disease who presents to [facility] for elective LHC due to abnormal stress test. She complains of ongoing exertional substernal chest tightness/pressure associated with ASHLEY, back pain, face tingling, and bilateral arm discomfort. She underwent  recent treadmill stress teston 3/15/24 which reproduced her chest pain symptoms and showed ST depression in leads V4-V6. Non-smoker. Does have familial history of CAD. TTE 3/24 reviewed, normal EF, trace MR, trace TR.

## 2024-03-26 NOTE — PLAN OF CARE
Discharge instructions, medications and safety concerns rev'd w/ pt and spouse, VSS. Pt ambulatory in CVRU w/o complications or complaints. Right radial/arterial site WDL, site w/o hematoma or bleeding, dressing is CDI.    IV removed, catheter intact, and dressing applied. Stressed importance of making and keeping all f/u appointments. Patient verbalized understanding and has no questions. Verified that patient has someone to drive patient home and instructed no driving for 24hrs. Pt wheeled to car.

## 2024-03-27 LAB
OHS QRS DURATION: 84 MS
OHS QTC CALCULATION: 451 MS

## 2024-03-28 NOTE — DISCHARGE SUMMARY
[facility] ([facility])  Discharge Note  Short Stay    Procedure(s) (LRB):  Left heart cath (Left)      OUTCOME: Patient tolerated treatment/procedure well without complication and is now ready for discharge.    DISPOSITION: Home or Self Care    FINAL DIAGNOSIS:  Abnormal stress ECG with treadmill    FOLLOWUP: In clinic    DISCHARGE INSTRUCTIONS:    Discharge Procedure Orders   Diet general     Call MD for:  temperature >100.4     Call MD for:  persistent nausea and vomiting     Call MD for:  severe uncontrolled pain     Call MD for:  difficulty breathing, headache or visual disturbances     Call MD for:  redness, tenderness, or signs of infection (pain, swelling, redness, odor or green/yellow discharge around incision site)     Call MD for:  hives     Call MD for:  persistent dizziness or light-headedness     Call MD for:  extreme fatigue        TIME SPENT ON DISCHARGE: 25 minutes

## 2024-04-10 ENCOUNTER — HOSPITAL ENCOUNTER (OUTPATIENT)
Dept: RADIOLOGY | Facility: HOSPITAL | Age: 64
Discharge: HOME | End: 2024-04-10
Attending: INTERNAL MEDICINE
Payer: COMMERCIAL

## 2024-04-10 ENCOUNTER — TELEPHONE (OUTPATIENT)
Dept: CARDIOLOGY | Facility: CLINIC | Age: 64
End: 2024-04-10
Payer: COMMERCIAL

## 2024-04-10 DIAGNOSIS — E05.20 TOXIC NODULAR GOITER: ICD-10-CM

## 2024-04-10 PROCEDURE — 76536 US EXAM OF HEAD AND NECK: CPT | Mod: TC

## 2024-04-10 NOTE — TELEPHONE ENCOUNTER
Pt calling to thank Miss Roa for her exceptional service and help with her past visit!!          ----- Message from Marissa Dominguez sent at 4/10/2024  4:38 PM CDT -----  Regarding: pt advice  PATIENT RETURNING CALL    Pt returned Janina's call. Please call pt at [phone]

## 2024-08-08 ENCOUNTER — LAB VISIT (OUTPATIENT)
Dept: LAB | Facility: HOSPITAL | Age: 64
End: 2024-08-08
Attending: STUDENT IN AN ORGANIZED HEALTH CARE EDUCATION/TRAINING PROGRAM
Payer: COMMERCIAL

## 2024-08-08 ENCOUNTER — OFFICE VISIT (OUTPATIENT)
Dept: ENDOCRINOLOGY | Facility: CLINIC | Age: 64
End: 2024-08-08
Payer: COMMERCIAL

## 2024-08-08 VITALS
RESPIRATION RATE: 17 BRPM | SYSTOLIC BLOOD PRESSURE: 120 MMHG | WEIGHT: 145.81 LBS | HEART RATE: 72 BPM | HEIGHT: 63 IN | BODY MASS INDEX: 25.84 KG/M2 | DIASTOLIC BLOOD PRESSURE: 79 MMHG

## 2024-08-08 DIAGNOSIS — E05.90 THYROTOXICOSIS WITHOUT THYROID STORM, UNSPECIFIED THYROTOXICOSIS TYPE: Primary | ICD-10-CM

## 2024-08-08 DIAGNOSIS — E78.5 HYPERLIPIDEMIA, UNSPECIFIED HYPERLIPIDEMIA TYPE: ICD-10-CM

## 2024-08-08 DIAGNOSIS — E05.90 THYROTOXICOSIS WITHOUT THYROID STORM, UNSPECIFIED THYROTOXICOSIS TYPE: ICD-10-CM

## 2024-08-08 DIAGNOSIS — Z13.1 SCREENING FOR DIABETES MELLITUS: ICD-10-CM

## 2024-08-08 LAB
CHOLEST SERPL-MCNC: 187 MG/DL (ref 120–199)
CHOLEST/HDLC SERPL: 3.4 {RATIO} (ref 2–5)
ESTIMATED AVG GLUCOSE: 126 MG/DL (ref 68–131)
HBA1C MFR BLD: 6 % (ref 4–5.6)
HDLC SERPL-MCNC: 55 MG/DL (ref 40–75)
HDLC SERPL: 29.4 % (ref 20–50)
LDLC SERPL CALC-MCNC: 100.4 MG/DL (ref 63–159)
NONHDLC SERPL-MCNC: 132 MG/DL
T4 FREE SERPL-MCNC: 0.82 NG/DL (ref 0.71–1.51)
TRIGL SERPL-MCNC: 158 MG/DL (ref 30–150)
TSH SERPL DL<=0.005 MIU/L-ACNC: 0.3 UIU/ML (ref 0.4–4)

## 2024-08-08 PROCEDURE — 83036 HEMOGLOBIN GLYCOSYLATED A1C: CPT | Performed by: STUDENT IN AN ORGANIZED HEALTH CARE EDUCATION/TRAINING PROGRAM

## 2024-08-08 PROCEDURE — 3078F DIAST BP <80 MM HG: CPT | Mod: CPTII,S$GLB,, | Performed by: STUDENT IN AN ORGANIZED HEALTH CARE EDUCATION/TRAINING PROGRAM

## 2024-08-08 PROCEDURE — 83520 IMMUNOASSAY QUANT NOS NONAB: CPT | Performed by: STUDENT IN AN ORGANIZED HEALTH CARE EDUCATION/TRAINING PROGRAM

## 2024-08-08 PROCEDURE — 84480 ASSAY TRIIODOTHYRONINE (T3): CPT | Performed by: STUDENT IN AN ORGANIZED HEALTH CARE EDUCATION/TRAINING PROGRAM

## 2024-08-08 PROCEDURE — 84443 ASSAY THYROID STIM HORMONE: CPT | Performed by: STUDENT IN AN ORGANIZED HEALTH CARE EDUCATION/TRAINING PROGRAM

## 2024-08-08 PROCEDURE — 3008F BODY MASS INDEX DOCD: CPT | Mod: CPTII,S$GLB,, | Performed by: STUDENT IN AN ORGANIZED HEALTH CARE EDUCATION/TRAINING PROGRAM

## 2024-08-08 PROCEDURE — G2211 COMPLEX E/M VISIT ADD ON: HCPCS | Mod: S$GLB,,, | Performed by: STUDENT IN AN ORGANIZED HEALTH CARE EDUCATION/TRAINING PROGRAM

## 2024-08-08 PROCEDURE — 1160F RVW MEDS BY RX/DR IN RCRD: CPT | Mod: CPTII,S$GLB,, | Performed by: STUDENT IN AN ORGANIZED HEALTH CARE EDUCATION/TRAINING PROGRAM

## 2024-08-08 PROCEDURE — 84439 ASSAY OF FREE THYROXINE: CPT | Performed by: STUDENT IN AN ORGANIZED HEALTH CARE EDUCATION/TRAINING PROGRAM

## 2024-08-08 PROCEDURE — 99204 OFFICE O/P NEW MOD 45 MIN: CPT | Mod: S$GLB,,, | Performed by: STUDENT IN AN ORGANIZED HEALTH CARE EDUCATION/TRAINING PROGRAM

## 2024-08-08 PROCEDURE — 1159F MED LIST DOCD IN RCRD: CPT | Mod: CPTII,S$GLB,, | Performed by: STUDENT IN AN ORGANIZED HEALTH CARE EDUCATION/TRAINING PROGRAM

## 2024-08-08 PROCEDURE — 3074F SYST BP LT 130 MM HG: CPT | Mod: CPTII,S$GLB,, | Performed by: STUDENT IN AN ORGANIZED HEALTH CARE EDUCATION/TRAINING PROGRAM

## 2024-08-08 PROCEDURE — 84445 ASSAY OF TSI GLOBULIN: CPT | Performed by: STUDENT IN AN ORGANIZED HEALTH CARE EDUCATION/TRAINING PROGRAM

## 2024-08-08 PROCEDURE — 80061 LIPID PANEL: CPT | Performed by: STUDENT IN AN ORGANIZED HEALTH CARE EDUCATION/TRAINING PROGRAM

## 2024-08-08 PROCEDURE — 99999 PR PBB SHADOW E&M-EST. PATIENT-LVL IV: CPT | Mod: PBBFAC,,, | Performed by: STUDENT IN AN ORGANIZED HEALTH CARE EDUCATION/TRAINING PROGRAM

## 2024-08-08 RX ORDER — HYDROCHLOROTHIAZIDE 25 MG/1
25 TABLET ORAL
COMMUNITY
Start: 2024-07-14

## 2024-08-08 RX ORDER — HYOSCYAMINE SULFATE 0.12 MG/1
0.12 TABLET SUBLINGUAL 2 TIMES DAILY PRN
COMMUNITY
Start: 2024-05-30

## 2024-08-08 RX ORDER — PROPYLTHIOURACIL 50 MG/1
150 TABLET ORAL
COMMUNITY
Start: 2024-05-20

## 2024-08-08 RX ORDER — FAMOTIDINE 40 MG/1
40 TABLET, FILM COATED ORAL NIGHTLY
COMMUNITY
Start: 2024-07-15

## 2024-08-08 RX ORDER — ZOLPIDEM TARTRATE 10 MG/1
10 TABLET ORAL NIGHTLY
COMMUNITY
Start: 2024-07-01

## 2024-08-08 RX ORDER — PANTOPRAZOLE SODIUM 40 MG/1
40 TABLET, DELAYED RELEASE ORAL DAILY
COMMUNITY

## 2024-08-08 RX ORDER — ATORVASTATIN CALCIUM 20 MG/1
20 TABLET, FILM COATED ORAL
COMMUNITY
Start: 2024-04-06

## 2024-08-08 RX ORDER — CYCLOBENZAPRINE HCL 10 MG
10 TABLET ORAL DAILY PRN
COMMUNITY
Start: 2024-06-18

## 2024-08-09 LAB — T3 SERPL-MCNC: 106 NG/DL (ref 60–180)

## 2024-08-10 LAB — TSH RECEP AB SER-ACNC: <1.1 IU/L (ref 0–1.75)

## 2024-08-12 LAB — TSI SER-ACNC: <0.1 IU/L

## 2024-08-13 ENCOUNTER — PATIENT MESSAGE (OUTPATIENT)
Dept: ENDOCRINOLOGY | Facility: CLINIC | Age: 64
End: 2024-08-13
Payer: COMMERCIAL

## 2024-11-19 ENCOUNTER — OFFICE VISIT (OUTPATIENT)
Dept: SURGERY | Facility: CLINIC | Age: 64
End: 2024-11-19
Payer: COMMERCIAL

## 2024-11-19 VITALS
BODY MASS INDEX: 27.79 KG/M2 | DIASTOLIC BLOOD PRESSURE: 80 MMHG | HEART RATE: 66 BPM | SYSTOLIC BLOOD PRESSURE: 167 MMHG | HEIGHT: 62 IN | WEIGHT: 151 LBS

## 2024-11-19 DIAGNOSIS — K21.9 GASTROESOPHAGEAL REFLUX DISEASE WITHOUT ESOPHAGITIS: Primary | ICD-10-CM

## 2024-11-19 PROCEDURE — 99204 OFFICE O/P NEW MOD 45 MIN: CPT | Mod: S$GLB,,, | Performed by: SURGERY

## 2024-11-19 PROCEDURE — 3079F DIAST BP 80-89 MM HG: CPT | Mod: CPTII,S$GLB,, | Performed by: SURGERY

## 2024-11-19 PROCEDURE — 99999 PR PBB SHADOW E&M-EST. PATIENT-LVL III: CPT | Mod: PBBFAC,,, | Performed by: SURGERY

## 2024-11-19 PROCEDURE — 1159F MED LIST DOCD IN RCRD: CPT | Mod: CPTII,S$GLB,, | Performed by: SURGERY

## 2024-11-19 PROCEDURE — 3077F SYST BP >= 140 MM HG: CPT | Mod: CPTII,S$GLB,, | Performed by: SURGERY

## 2024-11-19 PROCEDURE — 3008F BODY MASS INDEX DOCD: CPT | Mod: CPTII,S$GLB,, | Performed by: SURGERY

## 2024-11-19 PROCEDURE — 3044F HG A1C LEVEL LT 7.0%: CPT | Mod: CPTII,S$GLB,, | Performed by: SURGERY

## 2024-11-19 PROCEDURE — 1160F RVW MEDS BY RX/DR IN RCRD: CPT | Mod: CPTII,S$GLB,, | Performed by: SURGERY

## 2024-11-19 RX ORDER — NEOMYCIN AND POLYMYXIN B SULFATES AND BACITRACIN ZINC 400; 3.5; 1 [USP'U]/G; MG/G; [USP'U]/G
OINTMENT OPHTHALMIC 4 TIMES DAILY
COMMUNITY

## 2024-11-19 RX ORDER — CLINDAMYCIN HYDROCHLORIDE 300 MG/1
300 CAPSULE ORAL EVERY 8 HOURS
COMMUNITY

## 2024-11-19 RX ORDER — PANTOPRAZOLE SODIUM 40 MG/1
40 TABLET, DELAYED RELEASE ORAL 2 TIMES DAILY
Qty: 180 TABLET | Refills: 3 | Status: SHIPPED | OUTPATIENT
Start: 2024-11-19 | End: 2025-11-19

## 2024-11-19 NOTE — LETTER
November 19, 2024        Janice Guillen MD  [address] [facility]  [address] [place] [id number]             [facility]  [address]  [facility] [address]  Phone: [phone]   Patient: Tamar Price   MR Number: [id number]   YOB: 1960   Date of Visit: 11/19/2024       Dear Dr. Guillen:    Thank you for referring Tamar Price to me for evaluation. Attached you will find relevant portions of my assessment and plan of care.    If you have questions, please do not hesitate to call me. I look forward to following Tamar Price along with you.    Sincerely,      Mj Emerson MD            CC  No Recipients    Enclosure

## 2024-11-19 NOTE — PROGRESS NOTES
History & Physical    SUBJECTIVE:     History of Present Illness:  Patient is a 64 y.o. female presents with bmi 27, hld, prediabetes, and gerd.  She has had symptoms for several years.  It started with chest discomfort with negative cardiac work up.  She has gerd symptoms.  She has no dysphagia, nausea or vomiting.  She sleeps with hob elevated.  She does have constipation.    GERD Questionnaire     daily PPI,  tried for a month and initially it helped but stopped (sucralfate), takes pepcid at bed time if she has flare, about once every 3 weeks  Has occasional Typical heartburn  Has occasional Regurgitation  no Dysphagia solids  no Dysphagia liquids  Has occasional Hoarseness  no Sore throat  no Cough  no Asthma  has Chest pain  Has occasional Water brash  no Globus  no Nausea  no Vomiting      She also has back spasms.  She sees PT for that.    She is retired.    Chief Complaint   Patient presents with    Hernia     Previous fundoplication and GERD       Review of patient's allergies indicates:   Allergen Reactions    Latex, natural rubber     Sulfa (sulfonamide antibiotics)        Current Outpatient Medications   Medication Sig Dispense Refill    atorvastatin (LIPITOR) 20 MG tablet Take 20 mg by mouth.      ciprofloxacin HCl (CIPRO) 500 MG tablet Take 500 mg by mouth every 12 (twelve) hours.      cyclobenzaprine (FLEXERIL) 10 MG tablet Take 10 mg by mouth daily as needed.      diphenhydrAMINE (BENADRYL) 25 mg capsule Take 25 mg by mouth every 6 (six) hours as needed for Itching.      famotidine (PEPCID) 40 MG tablet Take 40 mg by mouth every evening.      FEXOFENADINE HCL (MUCINEX ALLERGY ORAL) Take 1 tablet by mouth as needed.      hydroCHLOROthiazide (HYDRODIURIL) 25 MG tablet Take 25 mg by mouth.      hydrocodone-acetaminophen 5-325mg (NORCO) 5-325 mg per tablet Take 1 tablet by mouth every 6 (six) hours as needed for Pain.      hyoscyamine 0.125 mg Subl 0.125 mg 2 (two) times daily as needed.       pantoprazole (PROTONIX) 40 MG tablet Take 40 mg by mouth once daily.      phenazopyridine (PYRIDIUM) 200 MG tablet Take 200 mg by mouth 3 (three) times daily as needed for Pain.      propylthiouraciL (PTU) 50 mg Tab Take 150 mg by mouth.      tizanidine 2 mg Cap Take 1 each by mouth continuous prn.      zolpidem (AMBIEN) 10 mg Tab Take 10 mg by mouth every evening.      ZOLPIDEM TARTRATE (ZOLPIDEM ORAL) Take 1 tablet by mouth as needed.       No current facility-administered medications for this visit.       Past Medical History:   Diagnosis Date    Abnormal EKG 03/26/2024    Anxiety     Arthritis     BACK    Chest tightness 03/26/2024    Insomnia     Kidney stone     Plantar fasciitis of left foot     Thyroid disease     HYPER    Wears glasses      Past Surgical History:   Procedure Laterality Date    BIOPSY OF THYROID  06/2024    COLONOSCOPY      CYST REMOVAL Left     BREAST    CYSTOSCOPY      ENDOSCOPY  07/01/2024    HYSTERECTOMY      LEFT HEART CATHETERIZATION Left 03/26/2024    Procedure: Left heart cath;  Surgeon: Gricelda Arana MD;  Location: [facility] CATH LAB;  Service: Cardiology;  Laterality: Left;  P2P pending with Dr. Chavez today 3/25     Family History   Problem Relation Name Age of Onset    Diabetes Father       Social History     Tobacco Use    Smoking status: Never   Substance Use Topics    Alcohol use: Yes     Comment: SOCIAL    Drug use: No        Review of Systems:  Review of Systems   Constitutional:  Negative for fever.   Respiratory:  Negative for shortness of breath.    Gastrointestinal:  Positive for constipation. Negative for abdominal pain and diarrhea.        Has occasional constipatioin   Genitourinary:  Positive for difficulty urinating.        Cystitis   Hematological:  Does not bruise/bleed easily.       OBJECTIVE:     Vital Signs (Most Recent)              Physical Exam:  Physical Exam  Vitals reviewed.   Constitutional:       Appearance: Normal appearance.   Abdominal:       Palpations: Abdomen is soft.      Tenderness: There is no abdominal tenderness.   Skin:     General: Skin is warm and dry.   Neurological:      General: No focal deficit present.      Mental Status: She is alert and oriented to person, place, and time.   Psychiatric:         Mood and Affect: Mood normal.         Behavior: Behavior normal.         Thought Content: Thought content normal.         Judgment: Judgment normal.         Laboratory  CBC: Reviewed  CMP: Reviewed  A1c elevated c/w prediabetes    Diagnostic Results:  Ugi 2024 reviewed, films not available, small hh, significant gerd  Egd 2024 reviewed, medium hiatal hernia, gastritis    ASSESSMENT/PLAN:     Severe gerd with breakthrough symptoms despite medication and likely a type 1hh.    PLAN:       I suggest ph and motility followed by robotic hh toupet.  She would like to try increasing her medications and will rx ppi bid.  We discussed eating 4h or more before laying down and gerd related foods.  Follow up 2 months.

## 2025-02-13 PROBLEM — G89.29 CHRONIC PAIN: Status: ACTIVE | Noted: 2025-02-13

## 2025-02-13 PROBLEM — F41.1 GAD (GENERALIZED ANXIETY DISORDER): Status: ACTIVE | Noted: 2025-02-13

## 2025-02-13 PROBLEM — G47.33 OBSTRUCTIVE SLEEP APNEA: Status: ACTIVE | Noted: 2025-02-13

## 2025-02-13 PROBLEM — G47.00 INSOMNIA: Status: ACTIVE | Noted: 2025-02-13

## 2025-02-13 PROBLEM — Z76.89 ENCOUNTER TO ESTABLISH CARE: Status: ACTIVE | Noted: 2025-02-13

## 2025-02-13 PROBLEM — I10 HYPERTENSION: Status: ACTIVE | Noted: 2025-02-13

## 2025-02-18 ENCOUNTER — TELEPHONE (OUTPATIENT)
Dept: ENDOCRINOLOGY | Facility: CLINIC | Age: 65
End: 2025-02-18
Payer: MEDICARE

## 2025-02-18 ENCOUNTER — OFFICE VISIT (OUTPATIENT)
Dept: ENDOCRINOLOGY | Facility: CLINIC | Age: 65
End: 2025-02-18
Payer: COMMERCIAL

## 2025-02-18 VITALS
SYSTOLIC BLOOD PRESSURE: 110 MMHG | BODY MASS INDEX: 27.45 KG/M2 | HEART RATE: 78 BPM | DIASTOLIC BLOOD PRESSURE: 74 MMHG | WEIGHT: 149.19 LBS | HEIGHT: 62 IN

## 2025-02-18 DIAGNOSIS — E05.90 HYPERTHYROIDISM: ICD-10-CM

## 2025-02-18 DIAGNOSIS — E05.90 THYROTOXICOSIS WITHOUT THYROID STORM, UNSPECIFIED THYROTOXICOSIS TYPE: Primary | ICD-10-CM

## 2025-02-18 DIAGNOSIS — E04.1 THYROID NODULE: ICD-10-CM

## 2025-02-18 DIAGNOSIS — E78.5 HYPERLIPIDEMIA, UNSPECIFIED HYPERLIPIDEMIA TYPE: ICD-10-CM

## 2025-02-18 PROCEDURE — 99999 PR PBB SHADOW E&M-EST. PATIENT-LVL IV: CPT | Mod: PBBFAC,,, | Performed by: STUDENT IN AN ORGANIZED HEALTH CARE EDUCATION/TRAINING PROGRAM

## 2025-02-18 RX ORDER — PROPRANOLOL HYDROCHLORIDE 10 MG/1
10 TABLET ORAL 3 TIMES DAILY
Qty: 90 TABLET | Refills: 0 | Status: SHIPPED | OUTPATIENT
Start: 2025-02-18 | End: 2025-03-20

## 2025-02-18 RX ORDER — PROPYLTHIOURACIL 50 MG/1
150 TABLET ORAL
COMMUNITY
Start: 2025-02-14

## 2025-02-18 NOTE — PROGRESS NOTES
"Subjective:      Patient ID: Tamar Price is a 65 y.o. female.    Chief Complaint:  Thyrotoxicosis    History of Present Illness  This is a 65 y.o. female. with a past medical history of thyrotoxicosis, HLD here for evaluation.    Thyrotoxicosis  Diagnosed in her 50s    Lab Results   Component Value Date    TSH 0.419 (L) 02/13/2025     PTU 50 mg BID - TID, only 2-3x   Unsure of work up for etiology    Biotin use: No  Amiodarone use: No    Weight:   Wt Readings from Last 6 Encounters:   02/18/25 67.7 kg (149 lb 3.2 oz)   02/13/25 69.4 kg (153 lb)   11/19/24 68.5 kg (151 lb 0.2 oz)   08/08/24 66.1 kg (145 lb 12.8 oz)   03/26/24 66.7 kg (147 lb 0.8 oz)   05/18/17 66.7 kg (147 lb)     Reports insomnia, anxiety, tremors    Denied neck enlargement, hoarseness, dysphagia.    RAFAEL in 2013, currently on TRT, topical estradiol PRN    Thyroid US (April 2024)  Right thyroid lobe measures 4.5 x 1.6 x 1.3 cm.  In the mid aspect of right thyroid lobe, a cystic nodule measuring 0.4 cm.  Left thyroid lobe measures 4.4 x 2.1 x 1.4 cm.  In the mid aspect of the left thyroid lobe, a nodule with cystic and solid components measuring 0.8 cm.  Thyroid isthmus measures 0.5 cm in thickness and demonstrates no abnormality.  Impression:  Bilateral benign-appearing thyroid nodules.      Review of Systems  As above    Social and family history reviewed  Current medications and allergies reviewed    Objective:   /74   Pulse 78   Ht 5' 2" (1.575 m)   Wt 67.7 kg (149 lb 3.2 oz)   BMI 27.29 kg/m²   Physical Exam  Alert, oriented  No thyromegaly    BP Readings from Last 1 Encounters:   02/18/25 110/74      Wt Readings from Last 1 Encounters:   02/18/25 1112 67.7 kg (149 lb 3.2 oz)     Body mass index is 27.29 kg/m².    Lab Review:   Lab Results   Component Value Date    HGBA1C 6.0 (H) 02/13/2025     Lab Results   Component Value Date    CHOL 270 (H) 02/13/2025    HDL 62 02/13/2025    LDLCALC 182 (H) 02/13/2025    TRIG 142 02/13/2025 "    CHOLHDL 29.4 08/08/2024     Lab Results   Component Value Date     (H) 02/13/2025    K 3.7 02/13/2025     02/13/2025    CO2 21 02/13/2025    GLU 95 02/13/2025    BUN 10 02/13/2025    CREATININE 0.63 02/13/2025    CALCIUM 9.8 02/13/2025    PROT 7.7 11/02/2023    ALBUMIN 4.8 02/13/2025    BILITOT 0.4 02/13/2025    ALKPHOS 64 11/02/2023    AST 19 02/13/2025    ALT 18 02/13/2025    ANIONGAP 6 03/21/2024    ESTGFRAFRICA >60 01/29/2021    EGFRNONAA >60 01/29/2021    TSH 0.419 (L) 02/13/2025       All pertinent labs reviewed    Assessment and Plan     Thyrotoxicosis without thyroid storm  Long term diagnosis  Unsure of etiology  TRAb and TSI were negative,  knowing they can be negative after being on thionamides for years  I do not suspect nodules are toxic based on US appearance (mostly cystic)  She seems to have done better with PTU which is still a good option, with exception of having to dose more frequently  She has been able to decrease dose and TSH has remained on lower end of normal    I also explained the concept for a TSH shift downwards in  [place] so, that TSH may be at goal for her    Given she is experiencing some symptoms, will add short acting beta blocker low dose        Thyroid nodule  She has 2 mostly cystic nodules, low risk  Repeat thyroid US prior to next appointment    Hyperlipidemia  Continue statin  Monitored by PCP      Follow-up in 4 months    Adelso Stockton MD  Endocrinology

## 2025-02-18 NOTE — ASSESSMENT & PLAN NOTE
Long term diagnosis  Unsure of etiology  TRAb and TSI were negative,  knowing they can be negative after being on thionamides for years  I do not suspect nodules are toxic based on US appearance (mostly cystic)  She seems to have done better with PTU which is still a good option, with exception of having to dose more frequently  She has been able to decrease dose and TSH has remained on lower end of normal    I also explained the concept for a TSH shift downwards in  [place] so, that TSH may be at goal for her    Given she is experiencing some symptoms, will add short acting beta blocker low dose

## 2025-02-18 NOTE — TELEPHONE ENCOUNTER
----- Message from Adelso Stockton MD sent at 2/18/2025 12:17 PM CST -----  She also needs thyroid ultrasound, can be same as labs. Not urgent since nodules look benign appearing on last ultrasound

## 2025-02-18 NOTE — TELEPHONE ENCOUNTER
Spoke with patient in regards of scheduling ultrasound.     Ultrasound Scheduled for: 02/20/25 @9:00  [facility]

## 2025-02-20 ENCOUNTER — HOSPITAL ENCOUNTER (OUTPATIENT)
Dept: RADIOLOGY | Facility: HOSPITAL | Age: 65
Discharge: HOME | End: 2025-02-20
Attending: STUDENT IN AN ORGANIZED HEALTH CARE EDUCATION/TRAINING PROGRAM
Payer: MEDICARE

## 2025-02-20 ENCOUNTER — RESULTS FOLLOW-UP (OUTPATIENT)
Dept: ENDOCRINOLOGY | Facility: CLINIC | Age: 65
End: 2025-02-20
Payer: MEDICARE

## 2025-02-20 DIAGNOSIS — E04.1 THYROID NODULE: ICD-10-CM

## 2025-02-20 PROCEDURE — 76536 US EXAM OF HEAD AND NECK: CPT | Mod: TC

## 2025-03-05 PROBLEM — R73.01 IFG (IMPAIRED FASTING GLUCOSE): Status: ACTIVE | Noted: 2025-03-05

## 2025-03-05 PROBLEM — F51.01 PRIMARY INSOMNIA: Status: ACTIVE | Noted: 2025-02-13

## 2025-03-05 PROBLEM — R00.2 PALPITATIONS: Status: ACTIVE | Noted: 2025-03-05

## 2025-03-05 PROBLEM — Z00.00 ROUTINE GENERAL MEDICAL EXAMINATION AT A HEALTH CARE FACILITY: Status: ACTIVE | Noted: 2025-03-05

## 2025-03-26 PROBLEM — E66.3 OVERWEIGHT (BMI 25.0-29.9): Status: ACTIVE | Noted: 2025-03-26

## 2025-04-30 PROBLEM — K57.90 DIVERTICULAR DISEASE: Status: ACTIVE | Noted: 2025-04-30

## 2025-04-30 PROBLEM — R06.02 SOB (SHORTNESS OF BREATH): Status: RESOLVED | Noted: 2024-03-26 | Resolved: 2025-04-30

## 2025-05-20 ENCOUNTER — LAB VISIT (OUTPATIENT)
Dept: LAB | Facility: HOSPITAL | Age: 65
End: 2025-05-20
Attending: INTERNAL MEDICINE
Payer: MEDICARE

## 2025-05-20 DIAGNOSIS — R13.10 ODYNOPHAGIA: ICD-10-CM

## 2025-05-20 DIAGNOSIS — R19.4 CHANGE IN BOWEL HABITS: Primary | ICD-10-CM

## 2025-05-20 DIAGNOSIS — K64.9 HEMORRHOIDS, UNSPECIFIED HEMORRHOID TYPE: ICD-10-CM

## 2025-05-20 PROCEDURE — 87046 STOOL CULTR AEROBIC BACT EA: CPT | Mod: 59

## 2025-05-20 PROCEDURE — 87177 OVA AND PARASITES SMEARS: CPT

## 2025-05-20 PROCEDURE — 87046 STOOL CULTR AEROBIC BACT EA: CPT

## 2025-05-20 PROCEDURE — 89055 LEUKOCYTE ASSESSMENT FECAL: CPT

## 2025-05-20 PROCEDURE — 82653 EL-1 FECAL QUANTITATIVE: CPT

## 2025-05-20 PROCEDURE — 87427 SHIGA-LIKE TOXIN AG IA: CPT

## 2025-05-20 PROCEDURE — 87328 CRYPTOSPORIDIUM AG IA: CPT

## 2025-05-20 PROCEDURE — 82705 FATS/LIPIDS FECES QUAL: CPT

## 2025-05-21 LAB
C COLI+JEJ+UPSA DNA STL QL NAA+NON-PROBE: NEGATIVE
CALPROTECTIN INTERP (OHS): ABNORMAL
CALPROTECTIN STOOL (OHS): 95.6 ΜG/G
CRYPTOSP AG SPEC QL: NEGATIVE
ELASTASE, STOOL INTERPRETATION (OHS): NORMAL
G LAMBLIA AG STL QL IA: NEGATIVE
PANCREATIC ELASTASE, FECAL (OHS): >800 ΜG/G
WBC #/AREA STL HPF: NORMAL /[HPF]

## 2025-05-22 LAB
E COLI SXT1 STL QL IA: NEGATIVE
E COLI SXT2 STL QL IA: NEGATIVE

## 2025-05-23 LAB
BACTERIA STL CULT: NORMAL
FAT STL QL: NORMAL
NEUTRAL FAT STL QL: NORMAL

## 2025-07-14 ENCOUNTER — LAB VISIT (OUTPATIENT)
Dept: LAB | Facility: HOSPITAL | Age: 65
End: 2025-07-14
Attending: STUDENT IN AN ORGANIZED HEALTH CARE EDUCATION/TRAINING PROGRAM
Payer: MEDICARE

## 2025-07-14 DIAGNOSIS — E05.90 THYROTOXICOSIS WITHOUT THYROID STORM, UNSPECIFIED THYROTOXICOSIS TYPE: ICD-10-CM

## 2025-07-14 LAB
T3FREE SERPL-MCNC: 112 NG/DL (ref 60–180)
T4 FREE SERPL-MCNC: 0.89 NG/DL (ref 0.71–1.51)
T4 FREE SERPL-MCNC: 0.89 NG/DL (ref 0.71–1.51)
TSH SERPL-ACNC: 0.45 UIU/ML (ref 0.4–4)

## 2025-07-14 PROCEDURE — 36415 COLL VENOUS BLD VENIPUNCTURE: CPT

## 2025-07-14 PROCEDURE — 84480 ASSAY TRIIODOTHYRONINE (T3): CPT

## 2025-07-14 PROCEDURE — 84439 ASSAY OF FREE THYROXINE: CPT

## 2025-07-17 ENCOUNTER — TELEPHONE (OUTPATIENT)
Dept: ENDOCRINOLOGY | Facility: CLINIC | Age: 65
End: 2025-07-17
Payer: MEDICARE

## 2025-07-17 NOTE — TELEPHONE ENCOUNTER
Spoke with patient regarding labs advised that labs that was drawn was Dr. Stockton's and that he will look over them for her appt.

## 2025-07-17 NOTE — TELEPHONE ENCOUNTER
----- Message from Idalia sent at 2025 11:18 AM CDT -----  Contact: Pt  Tamar Price  MRN: [id number]  : 1960  PCP: Mj Gibson Jr.  Home Phone      [phone]  Work Phone      Not on file.  Mobile          [phone]  Home Phone      Not on file.      MESSAGE: Pt is wanting to make sure the labs she had done on  for another physician be good or does she still need to get the labs ordered by  Dr. Stockton? Pt. States she has canceled the orders she had from Dr. Stockton.  She also wants to confirm her upcoming appt.on

## 2025-07-24 ENCOUNTER — OFFICE VISIT (OUTPATIENT)
Dept: ENDOCRINOLOGY | Facility: CLINIC | Age: 65
End: 2025-07-24
Payer: MEDICARE

## 2025-07-24 VITALS
HEIGHT: 62 IN | BODY MASS INDEX: 27.79 KG/M2 | DIASTOLIC BLOOD PRESSURE: 72 MMHG | SYSTOLIC BLOOD PRESSURE: 124 MMHG | WEIGHT: 151 LBS

## 2025-07-24 DIAGNOSIS — E04.1 THYROID NODULE: ICD-10-CM

## 2025-07-24 DIAGNOSIS — E05.90 THYROTOXICOSIS WITHOUT THYROID STORM, UNSPECIFIED THYROTOXICOSIS TYPE: Primary | ICD-10-CM

## 2025-07-24 DIAGNOSIS — F51.01 PRIMARY INSOMNIA: ICD-10-CM

## 2025-07-24 DIAGNOSIS — M85.88 OSTEOPENIA OF LUMBAR SPINE: ICD-10-CM

## 2025-07-24 PROCEDURE — 99999 PR PBB SHADOW E&M-EST. PATIENT-LVL IV: CPT | Mod: PBBFAC,,, | Performed by: STUDENT IN AN ORGANIZED HEALTH CARE EDUCATION/TRAINING PROGRAM

## 2025-07-24 PROCEDURE — 3061F NEG MICROALBUMINURIA REV: CPT | Mod: CPTII,S$GLB,, | Performed by: STUDENT IN AN ORGANIZED HEALTH CARE EDUCATION/TRAINING PROGRAM

## 2025-07-24 PROCEDURE — 99215 OFFICE O/P EST HI 40 MIN: CPT | Mod: S$GLB,,, | Performed by: STUDENT IN AN ORGANIZED HEALTH CARE EDUCATION/TRAINING PROGRAM

## 2025-07-24 PROCEDURE — 3066F NEPHROPATHY DOC TX: CPT | Mod: CPTII,S$GLB,, | Performed by: STUDENT IN AN ORGANIZED HEALTH CARE EDUCATION/TRAINING PROGRAM

## 2025-07-24 PROCEDURE — G2211 COMPLEX E/M VISIT ADD ON: HCPCS | Mod: S$GLB,,, | Performed by: STUDENT IN AN ORGANIZED HEALTH CARE EDUCATION/TRAINING PROGRAM

## 2025-07-24 PROCEDURE — 3078F DIAST BP <80 MM HG: CPT | Mod: CPTII,S$GLB,, | Performed by: STUDENT IN AN ORGANIZED HEALTH CARE EDUCATION/TRAINING PROGRAM

## 2025-07-24 PROCEDURE — 1159F MED LIST DOCD IN RCRD: CPT | Mod: CPTII,S$GLB,, | Performed by: STUDENT IN AN ORGANIZED HEALTH CARE EDUCATION/TRAINING PROGRAM

## 2025-07-24 PROCEDURE — 3008F BODY MASS INDEX DOCD: CPT | Mod: CPTII,S$GLB,, | Performed by: STUDENT IN AN ORGANIZED HEALTH CARE EDUCATION/TRAINING PROGRAM

## 2025-07-24 PROCEDURE — 3044F HG A1C LEVEL LT 7.0%: CPT | Mod: CPTII,S$GLB,, | Performed by: STUDENT IN AN ORGANIZED HEALTH CARE EDUCATION/TRAINING PROGRAM

## 2025-07-24 PROCEDURE — 3074F SYST BP LT 130 MM HG: CPT | Mod: CPTII,S$GLB,, | Performed by: STUDENT IN AN ORGANIZED HEALTH CARE EDUCATION/TRAINING PROGRAM

## 2025-07-24 RX ORDER — RISEDRONATE SODIUM 150 MG/1
150 TABLET, FILM COATED ORAL
Qty: 1 TABLET | Refills: 11 | Status: SHIPPED | OUTPATIENT
Start: 2025-07-24 | End: 2026-07-24

## 2025-07-24 RX ORDER — PROPYLTHIOURACIL 50 MG/1
50 TABLET ORAL EVERY 12 HOURS
Qty: 180 TABLET | Refills: 3 | Status: SHIPPED | OUTPATIENT
Start: 2025-07-24

## 2025-07-24 NOTE — PROGRESS NOTES
"Subjective:      Patient ID: Tamar Price is a 65 y.o. female.    Chief Complaint:  Thyrotoxicosis    History of Present Illness  This is a 65 y.o. female. with a past medical history of thyrotoxicosis, HLD here for evaluation.        Thyrotoxicosis  Diagnosed in her 50s     02/02/23 10:50 11/02/23 12:17 08/08/24 11:03 02/13/25 11:02 07/14/25 09:21   TSH 0.296 (L) 0.263 (L) 0.305 (L) 0.419 (L) 0.451   T3, Total   106  112   Free T4 0.85 0.87 0.82 1.10 0.89        mg daily but, only 2-3x week  Unsure of work up for etiology at diagnosis    Biotin use: No  Amiodarone use: No    Weight:   Wt Readings from Last 6 Encounters:   07/24/25 68.5 kg (151 lb)   06/30/25 68 kg (150 lb)   04/30/25 68 kg (150 lb)   03/26/25 67.1 kg (148 lb)   03/05/25 68.9 kg (152 lb)   02/18/25 67.7 kg (149 lb 3.2 oz)     Reports insomnia, anxiety, tremors    TAHBSO in 2013, currently on TRT, topical estradiol PRN      Thyroid US (Feb 2025)  The right lobe is 4.4 x 1.7 x 1.9 cm in the isthmus is 0.4 cm in the left lobe is 4 x 1.3 x 1.6 cm.  In the mid left lobe is a 0.6 x 0.5 x 0.5 cm mixed nodule with microcalcifications.  In the inferior mid right lobe is a 0.5 x 0.5 x 0.3 cm complex cystic nodule.  Impression:  Subcentimeter thyroid nodules stable and unchanged recommend follow-up in 1 year       Thyroid US (April 2024)  Right thyroid lobe measures 4.5 x 1.6 x 1.3 cm.  In the mid aspect of right thyroid lobe, a cystic nodule measuring 0.4 cm.  Left thyroid lobe measures 4.4 x 2.1 x 1.4 cm.  In the mid aspect of the left thyroid lobe, a nodule with cystic and solid components measuring 0.8 cm.  Thyroid isthmus measures 0.5 cm in thickness and demonstrates no abnormality.  Impression:  Bilateral benign-appearing thyroid nodules.      Review of Systems  As above    Social and family history reviewed  Current medications and allergies reviewed    Objective:   /72 (BP Location: Left arm, Patient Position: Sitting)   Ht 5' 2" " (1.575 m)   Wt 68.5 kg (151 lb)   BMI 27.62 kg/m²   Physical Exam  Alert, oriented  No thyromegaly    BP Readings from Last 1 Encounters:   07/24/25 124/72      Wt Readings from Last 1 Encounters:   07/24/25 1114 68.5 kg (151 lb)     Body mass index is 27.62 kg/m².    Lab Review:   Lab Results   Component Value Date    HGBA1C 6.0 (H) 02/13/2025     Lab Results   Component Value Date    CHOL 270 (H) 02/13/2025    HDL 62 02/13/2025    LDLCALC 182 (H) 02/13/2025    TRIG 142 02/13/2025    CHOLHDL 29.4 08/08/2024     Lab Results   Component Value Date     (H) 02/13/2025    K 3.7 02/13/2025     02/13/2025    CO2 21 02/13/2025    GLU 95 02/13/2025    BUN 10 02/13/2025    CREATININE 0.63 02/13/2025    CALCIUM 9.8 02/13/2025    PROT 7.7 11/02/2023    ALBUMIN 4.8 02/13/2025    BILITOT 0.4 02/13/2025    ALKPHOS 64 11/02/2023    AST 19 02/13/2025    ALT 18 02/13/2025    ANIONGAP 6 03/21/2024    ESTGFRAFRICA >60 01/29/2021    EGFRNONAA >60 01/29/2021    TSH 0.451 07/14/2025       All pertinent labs reviewed    Assessment and Plan     Thyrotoxicosis without thyroid storm  Long term diagnosis  Unsure of etiology work up at diagnosis  TRAb and TSI were negative,  knowing they can be negative after being on thionamides for years  I do not suspect nodules are toxic based on US appearance (mostly cystic)  She seems to have done better with PTU which is still a good option, with exception of having to dose more frequently  She has been having thyrotoxicosis symptoms so will try to dose PTU more frequently to bring TSH to a higher level    Plan  - Increase PTU to 50 mg BID daily  - TSH, FT4, T3 in 8 weeks        Thyroid nodule  She has 2 mostly cystic nodules, low risk  Repeat thyroid US in early 2026    Primary insomnia  Will bring TSH to mid to upper range to see if insomnia improves  Ok to continue zolpidem in the meantime      Follow-up in 4-6 months    Adelso Stockton MD   Endocrinology      41 minutes of total time  spent on the encounter, which includes face to face time and non-face to face time preparing to see the patient (e.g., review of tests), obtaining and/or reviewing separately obtained history, documenting clinical information in the electronic or other health record, independently interpreting results (not separately reported) and communicating results to the patient/family/caregiver, or care coordination (not separately reported).

## 2025-07-24 NOTE — ASSESSMENT & PLAN NOTE
Long term diagnosis  Unsure of etiology work up at diagnosis  TRAb and TSI were negative,  knowing they can be negative after being on thionamides for years  I do not suspect nodules are toxic based on US appearance (mostly cystic)  She seems to have done better with PTU which is still a good option, with exception of having to dose more frequently  She has been having thyrotoxicosis symptoms so will try to dose PTU more frequently to bring TSH to a higher level    Plan  - Increase PTU to 50 mg BID daily  - TSH, FT4, T3 in 8 weeks

## 2025-07-24 NOTE — ASSESSMENT & PLAN NOTE
Will bring TSH to mid to upper range to see if insomnia improves  Ok to continue zolpidem in the meantime

## 2025-07-24 NOTE — PATIENT INSTRUCTIONS
Change PTU to be taken 50 mg twice a day (morning and night) and taken every day    We will recheck levels in 8 weeks

## 2025-07-28 ENCOUNTER — PATIENT MESSAGE (OUTPATIENT)
Dept: ENDOCRINOLOGY | Facility: CLINIC | Age: 65
End: 2025-07-28
Payer: MEDICARE

## 2025-07-28 ENCOUNTER — HOSPITAL ENCOUNTER (OUTPATIENT)
Dept: RADIOLOGY | Facility: HOSPITAL | Age: 65
Discharge: HOME | End: 2025-07-28
Attending: FAMILY MEDICINE
Payer: MEDICARE

## 2025-07-28 ENCOUNTER — HOSPITAL ENCOUNTER (OUTPATIENT)
Dept: RADIOLOGY | Facility: HOSPITAL | Age: 65
Discharge: HOME | End: 2025-07-28
Attending: STUDENT IN AN ORGANIZED HEALTH CARE EDUCATION/TRAINING PROGRAM
Payer: MEDICARE

## 2025-07-28 DIAGNOSIS — M85.88 OSTEOPENIA OF LUMBAR SPINE: ICD-10-CM

## 2025-07-28 DIAGNOSIS — S99.921A INJURY OF RIGHT FOOT, INITIAL ENCOUNTER: ICD-10-CM

## 2025-07-28 PROCEDURE — 73630 X-RAY EXAM OF FOOT: CPT | Mod: TC,RT

## 2025-07-28 PROCEDURE — 77080 DXA BONE DENSITY AXIAL: CPT | Mod: TC

## (undated) DEVICE — CATH OPTITORQUE TIGER 5F 100CM

## (undated) DEVICE — DRAPE ANGIO BRACH 38X44IN

## (undated) DEVICE — GUIDEWIRE WHOLEY HI TORQ 175CM

## (undated) DEVICE — BAND TR COMP DEVICE REG 24CM

## (undated) DEVICE — PACK HEART CATH BR

## (undated) DEVICE — ANGIOTOUCH KIT

## (undated) DEVICE — CATH PIG145 INFINITI 5X110CM

## (undated) DEVICE — KIT GLIDESHEATH SLEND 6FR 10CM

## (undated) DEVICE — KIT SITE-RITE NDL GUIDE 21G

## (undated) DEVICE — GUIDEWIRE EMERALD .035IN 260CM

## (undated) DEVICE — KIT MANIFOLD LOW PRESS TUBING

## (undated) DEVICE — OMNIPAQUE 300MG 150ML VIAL